# Patient Record
Sex: FEMALE | Race: WHITE | Employment: FULL TIME | URBAN - METROPOLITAN AREA
[De-identification: names, ages, dates, MRNs, and addresses within clinical notes are randomized per-mention and may not be internally consistent; named-entity substitution may affect disease eponyms.]

---

## 2017-03-17 ENCOUNTER — OFFICE VISIT (OUTPATIENT)
Dept: ORTHOPEDIC SURGERY | Age: 62
End: 2017-03-17

## 2017-03-17 VITALS
SYSTOLIC BLOOD PRESSURE: 128 MMHG | HEIGHT: 68 IN | BODY MASS INDEX: 27.6 KG/M2 | DIASTOLIC BLOOD PRESSURE: 74 MMHG | WEIGHT: 182.1 LBS | HEART RATE: 82 BPM

## 2017-03-17 DIAGNOSIS — M25.552 HIP PAIN, LEFT: Primary | ICD-10-CM

## 2017-03-17 PROCEDURE — 99213 OFFICE O/P EST LOW 20 MIN: CPT | Performed by: ORTHOPAEDIC SURGERY

## 2017-03-17 PROCEDURE — 73502 X-RAY EXAM HIP UNI 2-3 VIEWS: CPT | Performed by: ORTHOPAEDIC SURGERY

## 2018-03-01 ENCOUNTER — HOSPITAL ENCOUNTER (OUTPATIENT)
Dept: OTHER | Age: 63
Discharge: HOME | End: 2018-03-01
Attending: NEUROLOGICAL SURGERY | Admitting: NEUROLOGICAL SURGERY

## 2018-03-01 ENCOUNTER — HOSPITAL ENCOUNTER (OUTPATIENT)
Dept: PHYSICAL THERAPY | Age: 63
Discharge: HOME | End: 2018-03-31

## 2018-03-01 ENCOUNTER — HOSPITAL ENCOUNTER (OUTPATIENT)
Dept: PHYSICAL THERAPY | Age: 63
Discharge: HOME | End: 2018-02-28
Admitting: NEUROLOGICAL SURGERY

## 2018-03-01 ASSESSMENT — PAIN DESCRIPTION - PAIN TYPE: TYPE: CHRONIC PAIN

## 2018-03-01 ASSESSMENT — PAIN DESCRIPTION - LOCATION: LOCATION: BACK;HIP;BUTTOCKS

## 2018-03-01 ASSESSMENT — PAIN DESCRIPTION - PROGRESSION: CLINICAL_PROGRESSION: GRADUALLY IMPROVING

## 2018-03-01 ASSESSMENT — PAIN DESCRIPTION - FREQUENCY: FREQUENCY: INTERMITTENT

## 2018-03-01 ASSESSMENT — PAIN DESCRIPTION - ORIENTATION: ORIENTATION: RIGHT

## 2018-03-01 ASSESSMENT — PAIN SCALES - GENERAL: PAINLEVEL_OUTOF10: 4

## 2018-03-01 NOTE — FLOWSHEET NOTE
Physical Therapy Daily Treatment Note    Date:  3/1/2018    Patient Name:  Jeancarlos Bernabe    :  1955  MRN: [id number]  Restrictions/Precautions:    Medical/Treatment Diagnosis Information:  · Diagnosis: LBP  Insurance/Certification information:  PT Insurance Information: [employer] PPO - 30 visits per year, high deductible  Physician Information:  Referring Practitioner: Bekah Perez MD  Plan of care signed (Y/N):  N  Visit# / total visits:  1/10     G-Code (if applicable):         PT G-Codes  Functional Assessment Tool Used: Modified Oswestry  Score: 34%  Functional Limitation: Mobility: Walking and moving around  Mobility: Walking and Moving Around Current Status (): At least 20 percent but less than 40 percent impaired, limited or restricted  Mobility: Walking and Moving Around Goal Status ():  At least 1 percent but less than 20 percent impaired, limited or restricted    Medicare Cap (if applicable):  n/a = total amount used, updated 3/1/2018    Time in:   3:30      Timed Treatment: 40 Total Treatment Time:  70  ________________________________________________________________________________________    Pain Level:    4-7/10 R lower back  SUBJECTIVE:  See initial eval    OBJECTIVE:     Exercise/Equipment Resistance/Repetitions Other comments   TG   NV         LTR 10x B HEP   TA set with PPT 10x5\"  HEP   Mod HS stretch 10x R HEP   piriformis stretch  5x10\" R HEP   hooklying multifidus holds NV    All 4 hip lift NV    Prone press up to prayer stretch NV if indicated    Assess hilda test NV    Prone triple extension NV    Standing glute holds NV                                                       Other Therapeutic Activities:  Education regarding POC including demonstration with models of anatomy and physiology in order to maximize benefits of treatment; total neuro re-ed 10 minutes    Manual Treatments:         Side-lying PI mob to R SIJ  Scissors MET  Shotgun technique  DTR to R

## 2018-03-01 NOTE — PROGRESS NOTES
occupation: primarily seated -    Leisure & Hobbies: would like to be able to walk her dogs   Objective     Observation/Palpation  Posture: Fair  Palpation: increased tenderness R QL, R and L piriformis, R hip flexor   Observation: neutral posture in standing; during ambulation, pt demonstrates minimal arm swing on R, decreased stance control on R LE with trunk sway and poor push off    AROM RLE (degrees)  RLE AROM: WNL  AROM LLE (degrees)  LLE AROM : WNL  Spine  Lumbar: flexion WFL with hamstring tightness, side bend B WFL without pain, extension WFL without pain  Special Tests: (-) SLR and slump B   Joint Mobility  Spine: hypomobility at R SI joint; R PI and L sacral rotation present    Strength RLE  Strength RLE: Exception  Comment: PGM 3-/5  R Hip Flexion: 4-/5  R Hip Extension: 4-/5  R Hip ABduction: 4/5  R Hip Internal Rotation: 3+/5 (with increased pain)  R Hip External Rotation: 4-/5  R Knee Flexion: 4/5  R Knee Extension: 4-/5  R Ankle Dorsiflexion: 3+/5  R Ankle Eversion: 4/5  R Great Toe Extension: 4/5  Strength LLE  Strength LLE: Exception  Comment: PGM 3-/5  L Hip Extension: 3-/5 (with aberrant muscle firing (delayed/absent multifidus))  L Hip ABduction: 4/5  L Hip Internal Rotation: 4/5  L Hip External Rotation: 4/5  Strength Other  Other: lower abdominals 2/5            Assessment   Conditions Requiring Skilled Therapeutic Intervention  Body structures, Functions, Activity limitations: Decreased ROM; Decreased strength;Decreased high-level IADLs  Assessment: Pt is a 66 y/o female who presents with chronic lower back pain, radicular R LE pain, peripheral neuropathy, and impaired muscle firing patterns which are leading to difficulty performing her job, taking dogs for a walk, or participate in household tasks. Recommend skilled, outpatient PT to address deficits and to attain the below-stated functional goals.    Prognosis: Good  Decision Making: Medium Complexity  REQUIRES PT FOLLOW UP:

## 2018-03-05 ENCOUNTER — HOSPITAL ENCOUNTER (OUTPATIENT)
Dept: PHYSICAL THERAPY | Age: 63
Discharge: HOME | End: 2018-03-06

## 2018-03-05 NOTE — FLOWSHEET NOTE
Physical Therapy Daily Treatment Note    Date:  3/5/2018    Patient Name:  Salas Santana    :  1955  MRN: [id number]  Restrictions/Precautions:    Medical/Treatment Diagnosis Information:  · Diagnosis: LBP  Insurance/Certification information:  PT Insurance Information: [employer] PPO - 30 visits per year, high deductible  Physician Information:  Referring Practitioner: Shaw Rome MD  Plan of care signed (Y/N):  N  Visit# / total visits:  2/10     G-Code (if applicable):         PT G-Codes  Functional Assessment Tool Used: Modified Oswestry  Score: 34%  Functional Limitation: Mobility: Walking and moving around  Mobility: Walking and Moving Around Current Status (): At least 20 percent but less than 40 percent impaired, limited or restricted  Mobility: Walking and Moving Around Goal Status (): At least 1 percent but less than 20 percent impaired, limited or restricted    Medicare Cap (if applicable):  n/a = total amount used, updated 3/5/2018    Time in:   3:00      Timed Treatment: 30 Total Treatment Time:  30  ________________________________________________________________________________________    Pain Level:    4-7/10 R lower back  SUBJECTIVE:  Pt reports that the exercises are going well. No significant change since eval - too early to tell.      OBJECTIVE:     Exercise/Equipment Resistance/Repetitions Other comments   TG   Level 3x5 minutes         LTR 10x B HEP   TA set with BP cuff   - BK   - March 10x5\"    - 10x B   - 10x B HEP   Mod HS stretch 10x R HEP   piriformis stretch  5x10\" R HEP   hooklying multifidus holds NV    All 4 hip lift NV    Prone press up to prayer stretch NV if indicated    Clamshell  NV    Assess hilda test NV    Prone triple extension NV    Standing glute holds NV                                                       Other Therapeutic Activities:      Manual Treatments:         Side-lying PI mob to R SIJ  Scissors MET  Shotgun technique  DTR to B piriformis  R LE long leg pull    Total manual 15 minutes    Modalities:  --    Test/Measurements:  See initial eval       ASSESSMENT:    Pt tolerated treatment well, demonstrating fair to good control of lumbar stabilization with BP cuff this date. Progress lumbopelvic strengthening as pt tolerates. Treatment/Activity Tolerance:   [x] Patient tolerated treatment well [] Patient limited by fatique  [] Patient limited by pain [] Patient limited by other medical complications  [] Other:     Goals:          Long term goals  Time Frame for Long term goals : 5 weeks  Long term goal 1: Pt will be independent with HEP  Long term goal 2: Pt will demonstrate pelvic symmetry consistently between sessions  Long term goal 3: Pt will demonstrate WFL R LE strength grossly  Long term goal 4: Pt will report sitting at work without limitation to back pain  Long term goal 5: Pt will demonstrate proper lifting, standing, ambulation mechanics to prevent future occurrance of pain     Plan: [x] Continue per plan of care [] Alter current plan (see comments)   [] Plan of care initiated [] Hold pending MD visit [] Discharge      Plan for Next Session:  Correction of biomechanical dysfunctions as they present on visit. Restore proper motor firing pattern and functional muscle activation as presented on visit. Progress as tolerates.     Re-Certification Due Date:         Signature:  Louise Quinn, PT

## 2018-04-01 ENCOUNTER — HOSPITAL ENCOUNTER (OUTPATIENT)
Dept: OTHER | Age: 63
Discharge: HOME | End: 2018-04-01
Attending: NEUROLOGICAL SURGERY | Admitting: NEUROLOGICAL SURGERY

## 2018-06-06 ENCOUNTER — HOSPITAL ENCOUNTER (OUTPATIENT)
Dept: VASCULAR LAB | Age: 63
Discharge: HOME | End: 2018-06-06
Attending: FAMILY MEDICINE | Admitting: FAMILY MEDICINE

## 2018-06-06 DIAGNOSIS — I83.90 ASYMPTOMATIC VARICOSE VEINS OF LOWER EXTREMITY: ICD-10-CM

## 2019-09-09 ENCOUNTER — OFFICE VISIT (OUTPATIENT)
Dept: ORTHOPEDIC SURGERY | Age: 64
End: 2019-09-09
Payer: COMMERCIAL

## 2019-09-09 VITALS — WEIGHT: 179.9 LBS | HEIGHT: 68 IN | BODY MASS INDEX: 27.26 KG/M2

## 2019-09-09 DIAGNOSIS — M25.551 RIGHT HIP PAIN: ICD-10-CM

## 2019-09-09 DIAGNOSIS — M70.61 GREATER TROCHANTERIC BURSITIS OF RIGHT HIP: Primary | ICD-10-CM

## 2019-09-09 PROCEDURE — 99214 OFFICE O/P EST MOD 30 MIN: CPT | Performed by: ORTHOPAEDIC SURGERY

## 2019-09-09 NOTE — PROGRESS NOTES
Ginny Oakes  [facility]#[id number]  LOT#- [id number]  EXP:04/2023    4C LIDOCAINE  NDC#-9993-1682-18  LOT#[id number]  EXP: 11/2020    2C DEPO  NDC#-3604-1885-96  [facility]#-[id number]  EXP: 02/2021    SITE: RT HIP
injury. Range of motion is unremarkable. There is no gross instability. There are no rashes, ulcerations or lesions. Strength and tone are normal.      Diagnostic Testing: The following x rays were read and interpreted by myself      1.  2 x-ray views of the right hip demonstrate stable LEFT total hip replacement. Mild right hip osteoarthritis but no significant changes    Orders     Orders Placed This Encounter   Procedures    XR HIP RIGHT (2-3 VIEWS)     Standing Status:   Future     Number of Occurrences:   1     Standing Expiration Date:   9/9/2020         Assessment / Treatment Plan:     1. Right hip trochanteric bursitis    She is interested in trying hip injection. She is going to do some home exercises. Follow-up in 3 to 4 weeks PRN or 3 months for reinjection    I discussed in detail the risks, benefits, and complications of an injection which include but are not limited to infection, skin reactions, hot swollen joints, and anaphylaxis with the patient. The patient verbalized good understanding and gave informed consent for the injection. The skin was prepped using sterile alcohol. A sterile 22-gauge needle was inserted into the area of maximal tenderness over the greater trochanter and a mixture of 4 mL of 2% Carbocaine, 4 mL of 0.25% Marcaine, and 80 mg of Depo-Medrol was injected under sterile technique. The needle was withdrawn and the puncture site sealed with a Band-Aid. Technique: Under sterile conditions a [employer] ultrasound unit with a variable frequency (6.0-15.0 MHz) linear transducer was used to localize the placement of a 22-gauge needle into the area of maximal tenderness over the greater trochanter. Findings: Successful needle placement for Hip injection. Final images were taken and saved for permanent record. The patient tolerated the injection well.  The patient was instructed to call the office immediately if there is any pain, redness, warmth, fever, or

## 2020-11-03 ENCOUNTER — TELEPHONE (OUTPATIENT)
Dept: ORTHOPEDIC SURGERY | Age: 65
End: 2020-11-03

## 2020-11-03 ENCOUNTER — OFFICE VISIT (OUTPATIENT)
Dept: ORTHOPEDIC SURGERY | Age: 65
End: 2020-11-03
Payer: COMMERCIAL

## 2020-11-03 VITALS — HEIGHT: 68 IN | WEIGHT: 180 LBS | BODY MASS INDEX: 27.28 KG/M2

## 2020-11-03 PROCEDURE — 99203 OFFICE O/P NEW LOW 30 MIN: CPT | Performed by: PODIATRIST

## 2020-11-03 PROCEDURE — L4361 PNEUMA/VAC WALK BOOT PRE OTS: HCPCS | Performed by: PODIATRIST

## 2020-11-03 RX ORDER — METHYLPREDNISOLONE 4 MG/1
TABLET ORAL
Qty: 1 KIT | Refills: 0 | Status: SHIPPED | OUTPATIENT
Start: 2020-11-03

## 2020-11-03 RX ORDER — DULOXETIN HYDROCHLORIDE 60 MG/1
CAPSULE, DELAYED RELEASE ORAL
COMMUNITY
Start: 2020-10-13

## 2020-11-03 NOTE — TELEPHONE ENCOUNTER
[place] [facility] [place]CERT REQU[place] - PER MELLIAS @ [address] [place] [address]  REF # [id number]  MP

## 2020-11-03 NOTE — PROGRESS NOTES
HISTORY OF PRESENT ILLNESS:  This is an initial visit for a 70-year-old female with a chief complaint of pain on the top of the left midfoot and forefoot. She has been having pain since March. No history of trauma is related. The pain is worsened with activity and relieved with rest. It is described as mostly a dull achy type pain but can be sharper at times. She is not had any direct treatment for this however before this began she was having problems with plantar fasciitis. She received 2 cortisone injections in the heel and shortly after the second injection, she felt a very sharp snapping sensation on top of her left forefoot. That has hurt since. FAMILY HISTORY:  Documented in chart. SOCIAL HISTORY:  Documented in chart. REVIEW OF SYSTEMS: The patient denies any problems with cardiovascular, pulmonary, gastrointestinal, neurologic, urologic, genitourinary, psychiatric, dermatologic, and HEENT systems. PHYSICAL EXAM:  The majority of palpable tenderness is over the dorsal lateral aspect of the left midfoot over the approximate area of the fourth and fifth metatarsal bases. There is no pain at the posterior lateral aspect of the left ankle. She has moderate pain also over the central metatarsal heads on the dorsum of the left foot. There is very mild edema present at the midfoot level but none in forefoot. There is no erythema or ecchymosis present. The patient has palpable pedal pulses bilateral.  The sensation is grossly intact bilateral.    X-RAYS: 3 weightbearing views of the left foot were obtained. No acute fracture dislocation is noted. ASSESSMENT: Tenosynovitis Forefoot, left foot. PLAN:  I educated the patient on the pathology and its treatment options. A high tide walker and temporary arch support was applied to the patient's left lower extremity. It was recommended that the patient use the boot even at night for sleeping.   All weightbearing activity is to be performed in the boot. .  Overall activity is to be decreased. Medrol Dosepak was prescribed for the short-term. We discussed the potential adverse reactions of this medication. I explained to the patient that this is essentially an overuse type injury secondary to the activity level, foot function, and weight. I discussed the recurrent nature of the episodes of pain and chronic nature of this problem. Both short and long term treatment was discussed. Follow-up will be in approximately 3 weeks. Procedures    Airselect Tall Pneumatic Walking Boot     Patient was prescribed a SabrinaIstpikaig Tall Walking Boot. The left foot will require stabilization / immobilization from this semi-rigid / rigid orthosis to improve their function. The orthosis will assist in protecting the affected area, provide functional support and facilitate healing. Patient was instructed to progress ambulation weight bearing as tolerated in the device. The patient was educated and fit by a healthcare professional with expert knowledge and specialization in brace application while under the direct supervision of the physician. Verbal and written instructions for the use of and application of this item were provided. They were instructed to contact the office immediately should the brace result in increased pain, decreased sensation, increased swelling or worsening of the condition.

## 2020-11-17 ENCOUNTER — OFFICE VISIT (OUTPATIENT)
Dept: ORTHOPEDIC SURGERY | Age: 65
End: 2020-11-17
Payer: COMMERCIAL

## 2020-11-17 VITALS — BODY MASS INDEX: 27.28 KG/M2 | HEIGHT: 68 IN | WEIGHT: 180 LBS

## 2020-11-17 PROCEDURE — L3040 FT ARCH SUPRT PREMOLD LONGIT: HCPCS | Performed by: PODIATRIST

## 2020-11-17 PROCEDURE — 99212 OFFICE O/P EST SF 10 MIN: CPT | Performed by: PODIATRIST

## 2024-08-26 SDOH — HEALTH STABILITY: PHYSICAL HEALTH: ON AVERAGE, HOW MANY MINUTES DO YOU ENGAGE IN EXERCISE AT THIS LEVEL?: 0 MIN

## 2024-08-26 SDOH — HEALTH STABILITY: PHYSICAL HEALTH: ON AVERAGE, HOW MANY DAYS PER WEEK DO YOU ENGAGE IN MODERATE TO STRENUOUS EXERCISE (LIKE A BRISK WALK)?: 0 DAYS

## 2024-08-27 ENCOUNTER — OFFICE VISIT (OUTPATIENT)
Dept: ORTHOPEDIC SURGERY | Age: 69
End: 2024-08-27
Payer: COMMERCIAL

## 2024-08-27 VITALS — WEIGHT: 180 LBS | BODY MASS INDEX: 27.28 KG/M2 | HEIGHT: 68 IN

## 2024-08-27 DIAGNOSIS — M25.552 LEFT HIP PAIN: Primary | ICD-10-CM

## 2024-08-27 PROCEDURE — 99203 OFFICE O/P NEW LOW 30 MIN: CPT | Performed by: STUDENT IN AN ORGANIZED HEALTH CARE EDUCATION/TRAINING PROGRAM

## 2024-08-27 PROCEDURE — 1123F ACP DISCUSS/DSCN MKR DOCD: CPT | Performed by: STUDENT IN AN ORGANIZED HEALTH CARE EDUCATION/TRAINING PROGRAM

## 2024-08-27 RX ORDER — ESTRADIOL 2 MG/1
1 TABLET ORAL DAILY
COMMUNITY
Start: 2020-06-22

## 2024-08-27 RX ORDER — MODAFINIL 100 MG/1
100 TABLET ORAL DAILY
COMMUNITY
Start: 2024-06-24 | End: 2024-12-21

## 2024-08-27 RX ORDER — PREGABALIN 75 MG/1
75 CAPSULE ORAL 2 TIMES DAILY
COMMUNITY
Start: 2024-07-29 | End: 2024-10-27

## 2024-08-27 NOTE — PROGRESS NOTES
69-year-old female presents for follow-up with a history of left hip replacement in 2008.  Previous function well not having any pain just presents for surveillance evaluatio.    On exam incisions well-healed there is no swelling there is no neurovascular compromise.  No tenderness    AP pelvis x-ray ordered obtained reviewed shows good position of ingrown implants which is a metal-on-metal hip replacement and no abnormalities.    69-year-old female with remote history of left hip replacement is functioning well  Normalities on x-ray.  Advised she does not need any scheduled surveillance and to follow-up on an as-needed basis.